# Patient Record
Sex: FEMALE | Race: WHITE | Employment: OTHER
[De-identification: names, ages, dates, MRNs, and addresses within clinical notes are randomized per-mention and may not be internally consistent; named-entity substitution may affect disease eponyms.]

---

## 2024-01-05 ENCOUNTER — TRANSCRIBE ORDERS (OUTPATIENT)
Dept: OTHER | Age: 89
End: 2024-01-05

## 2024-01-05 DIAGNOSIS — M17.0 PRIMARY OSTEOARTHRITIS OF BOTH KNEES: Primary | ICD-10-CM

## 2024-01-29 ENCOUNTER — TELEPHONE (OUTPATIENT)
Dept: PHYSICAL MEDICINE AND REHAB | Facility: CLINIC | Age: 89
End: 2024-01-29
Payer: MEDICARE

## 2024-01-29 NOTE — TELEPHONE ENCOUNTER
80% of patients who do well with test blocks have 50% or more relief with this procedure.  That does lead 20% who do not.  There is also potential that the patient will not receive relief with test blocks.  I have had similar patients have relief in some that have not.  It is hard for me to tell where your mother would be.

## 2024-01-29 NOTE — TELEPHONE ENCOUNTER
Patient is scheduled for a new patient consultation appointment to discuss Coolief for knees. Daughter, Xu, calling to inquire is this something that will be successful for someone who is 89 years old and had no cartilage in her knees. If it would not be, then they would cancel the appointment tomorrow. She does not want to put her mother through the pain of coming if not for her. Please advise.

## 2024-01-29 NOTE — TELEPHONE ENCOUNTER
"Phone call back to patient's daughter to relay this information to her. She plans to bring her mother in to \"give it a try.\" Very appreciative of the response from [facility] as well as call the prompt call back.    "

## 2024-01-30 ENCOUNTER — OFFICE VISIT (OUTPATIENT)
Dept: PHYSICAL MEDICINE AND REHAB | Facility: CLINIC | Age: 89
End: 2024-01-30
Payer: MEDICARE

## 2024-01-30 VITALS — SYSTOLIC BLOOD PRESSURE: 89 MMHG | HEART RATE: 78 BPM | DIASTOLIC BLOOD PRESSURE: 49 MMHG

## 2024-01-30 DIAGNOSIS — G89.29 CHRONIC PAIN OF LEFT KNEE: Primary | ICD-10-CM

## 2024-01-30 DIAGNOSIS — M25.562 CHRONIC PAIN OF LEFT KNEE: Primary | ICD-10-CM

## 2024-01-30 DIAGNOSIS — M25.561 CHRONIC PAIN OF RIGHT KNEE: ICD-10-CM

## 2024-01-30 DIAGNOSIS — M17.0 PRIMARY OSTEOARTHRITIS OF BOTH KNEES: ICD-10-CM

## 2024-01-30 DIAGNOSIS — G89.29 CHRONIC PAIN OF RIGHT KNEE: ICD-10-CM

## 2024-01-30 PROBLEM — H53.9 VISION DISTURBANCE FOLLOWING CVA (CEREBROVASCULAR ACCIDENT): Status: ACTIVE | Noted: 2019-07-15

## 2024-01-30 PROBLEM — D12.6 BENIGN NEOPLASM OF COLON: Status: ACTIVE | Noted: 2019-06-22

## 2024-01-30 PROBLEM — I48.92 PAROXYSMAL ATRIAL FLUTTER (H): Status: ACTIVE | Noted: 2019-06-23

## 2024-01-30 PROBLEM — H11.149 CONJUNCTIVAL XEROSIS: Status: ACTIVE | Noted: 2019-06-22

## 2024-01-30 PROBLEM — I69.398 VISION DISTURBANCE FOLLOWING CVA (CEREBROVASCULAR ACCIDENT): Status: ACTIVE | Noted: 2019-07-15

## 2024-01-30 PROBLEM — E78.5 HYPERLIPIDEMIA: Status: ACTIVE | Noted: 2019-06-22

## 2024-01-30 PROBLEM — G43.909 MIGRAINE HEADACHE: Status: ACTIVE | Noted: 2019-06-22

## 2024-01-30 PROBLEM — E03.9 HYPOTHYROIDISM: Status: ACTIVE | Noted: 2019-06-22

## 2024-01-30 PROBLEM — H53.462 LEFT HOMONYMOUS HEMIANOPSIA: Status: ACTIVE | Noted: 2019-08-20

## 2024-01-30 PROBLEM — H35.3230 BILATERAL EXUDATIVE AGE-RELATED MACULAR DEGENERATION (H): Status: ACTIVE | Noted: 2019-08-20

## 2024-01-30 PROBLEM — Z86.73 HISTORY OF CVA IN ADULTHOOD: Status: ACTIVE | Noted: 2019-07-15

## 2024-01-30 PROBLEM — I63.9 CEREBROVASCULAR ACCIDENT (CVA) DUE TO EMBOLISM (H): Status: ACTIVE | Noted: 2019-06-23

## 2024-01-30 PROBLEM — G45.9 TRANSIENT ISCHEMIC ATTACK (TIA): Status: ACTIVE | Noted: 2019-08-25

## 2024-01-30 PROCEDURE — 99204 OFFICE O/P NEW MOD 45 MIN: CPT | Performed by: PAIN MEDICINE

## 2024-01-30 RX ORDER — LEVOTHYROXINE SODIUM 125 UG/1
125 TABLET ORAL
COMMUNITY
Start: 2023-06-13

## 2024-01-30 RX ORDER — ROSUVASTATIN CALCIUM 10 MG/1
10 TABLET, COATED ORAL DAILY
COMMUNITY
Start: 2023-06-13

## 2024-01-30 ASSESSMENT — PAIN SCALES - GENERAL: PAINLEVEL: MODERATE PAIN (5)

## 2024-01-30 NOTE — PATIENT INSTRUCTIONS
[facility] Injection Requirements    A  is required for all fluoroscopically-guided injections.  Injection appointments may be cancelled if there are signs/symptoms of an active infection or if the patient is being actively treated with antibiotics for a diagnosed infection.  Patients may have their steroid injection cancelled if they have had another steroid injection within 2 weeks.  Diabetic patients will have their blood glucose levels checked the day of their injection and the appointment will be rescheduled if the blood glucose level is 300 or higher.  Patients with allergies to cortisone, local anesthetics, iodine, or contrast dye should contact the Spine Center to further discuss these considerations.  Patients scheduled for medial branch block diagnostic injections should refrain from taking pain medication the day of the procedure.  The medial branch block injection appointment will be rescheduled if the patient's pain rating is not 5/10 or greater at the time of the procedure.  Patients taking warfarin/Coumadin will have their INR checked the day of the procedure and the procedure may be rescheduled if the INR is greater than 3.0.  Please contact the Spine Center [phone]) if you are taking any prescription blood-thinning medications (aspirin, warfarin, Plavix, Lovenox, Eliquis, Brilinta, Effient, etc.) as special dosing adjustments may need to be made depending on the type of injection you are scheduled to receive.  It is recommended that you delay having your steroid injection if you have received any vaccines within 2 weeks.    ~Please call Nurse Navigation line [phone] with any questions or concerns about your treatment plan, if symptoms worsen and you would like to be seen urgently, or if you have problems controlling bladder and bowel function.

## 2024-01-30 NOTE — LETTER
1/30/2024         RE: Alma Berg  [address]  [facility] [address]        Dear Colleague,    Thank you for referring your patient, Alma Berg, to the [facility]. Please see a copy of my visit note below.    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  She is not a good candidate for surgery secondary to her age and she was referred here for potential genicular nerve radiofrequency ablation.    Patient is neurologically intact on exam. No myelopathic or red flag symptoms.     Diagnoses and all orders for this visit:  Chronic pain of left knee  -     PAIN Knee Genicular Nerve Block Left; Future  -     PAIN US Guided Peripheral NB; Future  Primary osteoarthritis of both knees  -     Orthopedic  Referral  -     PAIN Knee Genicular Nerve Block Left; Future  -     PAIN US Guided Peripheral NB; Future  Chronic pain of right knee    PLAN:  Reviewed spine anatomy and disease process. Discussed diagnosis and treatment options with the patient today. A shared decision making model was used.  The patient's values and choices were respected. The following represents what was discussed and decided upon by the provider and the patient.      -DIAGNOSTIC TESTS:    -- X-ray report of the knees is reviewed.    -PHYSICAL THERAPY: I recommend that she  continue with home exercises on a consistent basis.  Discussed the importance of core strengthening, ROM, stretching exercises with the patient and how each of these entities is important in decreasing pain.  Explained to the patient that the purpose of physical therapy is to teach the patient a home exercise program.  These exercises need to be performed every day in order to decrease pain and prevent future occurrences of pain.        -MEDICATIONS: No changes to medications.  -  Discussed multiple medication options today with patient. Discussed risks, side effects, and proper use of medications. Patient verbalized understanding.    -INTERVENTIONS: I ordered left knee genicular nerve blocks with an either radiofrequency ablation.  After we have done the left knee we would then focus our attention on the right knee with same procedures.  Discussed risks and benefits of injections with patient today.    -PATIENT EDUCATION: We discussed pain management in a multiple to fashion including physical therapy, medication management, possible future injections.    -FOLLOW-UP:   Patient will follow-up after injections.    Advised patient to call the Spine Center if symptoms worsen or you have problems controlling bladder and bowel function.   ______________________________________________________________________    SUBJECTIVE:  HPI:  Alma Berg  Is a 89 year old female who presents today for new patient evaluation of chronic left greater than right knee pain.  Patient notes that she has had pain for several years and has had steroid injections on an almost 3-month basis until they were not working about 3 years ago.  She had a discussion with an orthopedic surgeon who did not think she was a good candidate for surgery secondary to her age.  She was referred for possible genicular nerve radiofrequency ablation.  Patient reports that her pain is worse when she is getting up and down or walking.  She has been confined to  a wheelchair for most of her time while awake for several years secondary to her knee pain.  She only gets off the wheelchair when she is toileting or going to bed.  Her pain today is 6/10 at its worst is 9/10 its best a 0/10 when she is sitting.  She is here with her daughter who is very helpful in the planning process.  She denies any bowel or bladder changes, fevers, chills, unintentional weight loss.    -Treatment to Date: Several bilateral knee injections with cortisone.  Physical therapy.    -Medications:    Current Outpatient Medications   Medication     levothyroxine (SYNTHROID/LEVOTHROID) 125 MCG tablet     rivaroxaban ANTICOAGULANT (XARELTO ANTICOAGULANT) 15 MG TABS tablet     rosuvastatin (CRESTOR) 10 MG tablet     No current facility-administered medications for this visit.       Allergies   Allergen Reactions     Sulfa Antibiotics Rash       No past medical history on file.     Patient Active Problem List   Diagnosis     Benign neoplasm of colon     Bilateral exudative age-related macular degeneration (H)     Cerebrovascular accident (CVA) due to embolism (H)     Conjunctival xerosis     Constipation     Degenerative joint disease of knee     Degenerative joint disease of left hip     Difficulty sleeping     History of CVA in adulthood     Hyperlipidemia     Hypothyroidism     Left homonymous hemianopsia     Migraine headache     Osteopenia     Paroxysmal atrial flutter (H)     Postoperative anemia     Prediabetes     Transient ischemic attack (TIA)     Urethral caruncle     Vision disturbance following CVA (cerebrovascular accident)       Past surgical history: Right hip replacement.    Family history: Her grandmother had a stroke.    Reviewed past medical, surgical, and family history with patient found on new patient intake packet located in EMR Media tab.     SOCIAL HX: She denies smoking or using recreational drugs.  She drinks alcohol 3 times per year.    ROS:  Specifically negative for  bowel/bladder dysfunction, balance changes, headache, dizziness, foot drop, fevers, chills, appetite changes, nausea/vomiting, unexplained weight loss. Otherwise 13 systems reviewed are negative. Please see the patient's intake questionnaire from today for details.    OBJECTIVE:  BP (!) 89/49   Pulse 78     PHYSICAL EXAMINATION:    --CONSTITUTIONAL:  Vital signs as above.  No acute distress.  The patient is well nourished and well groomed.  --PSYCHIATRIC:  Appropriate mood and affect. The patient is awake, alert, oriented to person, place, time and answering questions appropriately with clear speech.    --SKIN:  Skin over the face and lower extremities is clean, dry, intact without rashes.    --RESPIRATORY: Normal rhythm and effort. No abnormal accessory muscle breathing patterns noted.   --STANDING EXAMINATION:  Normal lumbar lordosis noted, no lateral shift.  --SACROILIAC JOINT:  One Finger point test negative.  --GROSS MOTOR: Gait is antalgic.  Rises from a seated position with difficulty.  --LOWER EXTREMITY MOTOR TESTING:  Plantar flexion left 5/5, right 5/5   Dorsiflexion left 5/5, right 5/5   Great toe MTP extension left 5/5, right 5/5  Knee flexion left 5/5, right 5/5  Knee extension left 5/5, right 5/5   Hip flexion left 5/5, right 5/5  Hip abduction left 5/5, right 5/5  Hip adduction left 5/5, right 5/5   --Knees: Tenderness to palpation in the anterior/lateral/medial portions of her knees.  --NEUROLOGICAL:    Sensation to light touch is intact in the bilateral L4, L5, and S1 dermatomes.         Again, thank you for allowing me to participate in the care of your patient.        Sincerely,        Russell Lim, DO

## 2024-01-30 NOTE — PROGRESS NOTES
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  She is not a good candidate for surgery secondary to her age and she was referred here for potential genicular nerve radiofrequency ablation.    Patient is neurologically intact on exam. No myelopathic or red flag symptoms.     Diagnoses and all orders for this visit:  Chronic pain of left knee  -     PAIN Knee Genicular Nerve Block Left; Future  -     PAIN US Guided Peripheral NB; Future  Primary osteoarthritis of both knees  -     Orthopedic  Referral  -     PAIN Knee Genicular Nerve Block Left; Future  -     PAIN US Guided Peripheral NB; Future  Chronic pain of right knee    PLAN:  Reviewed spine anatomy and disease process. Discussed diagnosis and treatment options with the patient today. A shared decision making model was used.  The patient's values and choices were respected. The following represents what was discussed and decided upon by the provider and the patient.      -DIAGNOSTIC TESTS:    -- X-ray report of the knees is reviewed.    -PHYSICAL THERAPY: I recommend that she continue with home exercises on a consistent basis.  Discussed the importance of core strengthening, ROM, stretching exercises with the patient and how each of these entities is important in decreasing pain.  Explained to the patient that the purpose of physical therapy  is to teach the patient a home exercise program.  These exercises need to be performed every day in order to decrease pain and prevent future occurrences of pain.        -MEDICATIONS: No changes to medications.  -  Discussed multiple medication options today with patient. Discussed risks, side effects, and proper use of medications. Patient verbalized understanding.    -INTERVENTIONS: I ordered left knee genicular nerve blocks with an either radiofrequency ablation.  After we have done the left knee we would then focus our attention on the right knee with same procedures.  Discussed risks and benefits of injections with patient today.    -PATIENT EDUCATION: We discussed pain management in a multiple to fashion including physical therapy, medication management, possible future injections.    -FOLLOW-UP:   Patient will follow-up after injections.    Advised patient to call the Spine Center if symptoms worsen or you have problems controlling bladder and bowel function.   ______________________________________________________________________    SUBJECTIVE:  HPI:  Alma Berg  Is a 89 year old female who presents today for new patient evaluation of chronic left greater than right knee pain.  Patient notes that she has had pain for several years and has had steroid injections on an almost 3-month basis until they were not working about 3 years ago.  She had a discussion with an orthopedic surgeon who did not think she was a good candidate for surgery secondary to her age.  She was referred for possible genicular nerve radiofrequency ablation.  Patient reports that her pain is worse when she is getting up and down or walking.  She has been confined to a wheelchair for most of her time while awake for several years secondary to her knee pain.  She only gets off the wheelchair when she is toileting or going to bed.  Her pain today is 6/10 at its worst is 9/10 its best a 0/10 when she is sitting.  She is here with her  daughter who is very helpful in the planning process.  She denies any bowel or bladder changes, fevers, chills, unintentional weight loss.    -Treatment to Date: Several bilateral knee injections with cortisone.  Physical therapy.    -Medications:    Current Outpatient Medications   Medication    levothyroxine (SYNTHROID/LEVOTHROID) 125 MCG tablet    rivaroxaban ANTICOAGULANT (XARELTO ANTICOAGULANT) 15 MG TABS tablet    rosuvastatin (CRESTOR) 10 MG tablet     No current facility-administered medications for this visit.       Allergies   Allergen Reactions    Sulfa Antibiotics Rash       No past medical history on file.     Patient Active Problem List   Diagnosis    Benign neoplasm of colon    Bilateral exudative age-related macular degeneration (H)    Cerebrovascular accident (CVA) due to embolism (H)    Conjunctival xerosis    Constipation    Degenerative joint disease of knee    Degenerative joint disease of left hip    Difficulty sleeping    History of CVA in adulthood    Hyperlipidemia    Hypothyroidism    Left homonymous hemianopsia    Migraine headache    Osteopenia    Paroxysmal atrial flutter (H)    Postoperative anemia    Prediabetes    Transient ischemic attack (TIA)    Urethral caruncle    Vision disturbance following CVA (cerebrovascular accident)       Past surgical history: Right hip replacement.    Family history: Her grandmother had a stroke.    Reviewed past medical, surgical, and family history with patient found on new patient intake packet located in EMR Media tab.     SOCIAL HX: She denies smoking or using recreational drugs.  She drinks alcohol 3 times per year.    ROS:  Specifically negative for bowel/bladder dysfunction, balance changes, headache, dizziness, foot drop, fevers, chills, appetite changes, nausea/vomiting, unexplained weight loss. Otherwise 13 systems reviewed are negative. Please see the patient's intake questionnaire from today for details.    OBJECTIVE:  BP (!) 89/49   Pulse  78     PHYSICAL EXAMINATION:    --CONSTITUTIONAL:  Vital signs as above.  No acute distress.  The patient is well nourished and well groomed.  --PSYCHIATRIC:  Appropriate mood and affect. The patient is awake, alert, oriented to person, place, time and answering questions appropriately with clear speech.    --SKIN:  Skin over the face and lower extremities is clean, dry, intact without rashes.    --RESPIRATORY: Normal rhythm and effort. No abnormal accessory muscle breathing patterns noted.   --STANDING EXAMINATION:  Normal lumbar lordosis noted, no lateral shift.  --SACROILIAC JOINT:  One Finger point test negative.  --GROSS MOTOR: Gait is antalgic.  Rises from a seated position with difficulty.  --LOWER EXTREMITY MOTOR TESTING:  Plantar flexion left 5/5, right 5/5   Dorsiflexion left 5/5, right 5/5   Great toe MTP extension left 5/5, right 5/5  Knee flexion left 5/5, right 5/5  Knee extension left 5/5, right 5/5   Hip flexion left 5/5, right 5/5  Hip abduction left 5/5, right 5/5  Hip adduction left 5/5, right 5/5   --Knees: Tenderness to palpation in the anterior/lateral/medial portions of her knees.  --NEUROLOGICAL:    Sensation to light touch is intact in the bilateral L4, L5, and S1 dermatomes.

## 2024-02-14 ENCOUNTER — RADIOLOGY INJECTION OFFICE VISIT (OUTPATIENT)
Dept: PHYSICAL MEDICINE AND REHAB | Facility: CLINIC | Age: 89
End: 2024-02-14
Attending: PAIN MEDICINE
Payer: MEDICARE

## 2024-02-14 VITALS
TEMPERATURE: 97.6 F | HEART RATE: 106 BPM | SYSTOLIC BLOOD PRESSURE: 132 MMHG | RESPIRATION RATE: 16 BRPM | OXYGEN SATURATION: 98 % | DIASTOLIC BLOOD PRESSURE: 76 MMHG

## 2024-02-14 DIAGNOSIS — M17.0 PRIMARY OSTEOARTHRITIS OF BOTH KNEES: ICD-10-CM

## 2024-02-14 DIAGNOSIS — G89.29 CHRONIC PAIN OF LEFT KNEE: ICD-10-CM

## 2024-02-14 DIAGNOSIS — M25.562 CHRONIC PAIN OF LEFT KNEE: ICD-10-CM

## 2024-02-14 PROCEDURE — 64454 NJX AA&/STRD GNCLR NRV BRNCH: CPT | Mod: LT | Performed by: PAIN MEDICINE

## 2024-02-14 RX ORDER — BUPIVACAINE HYDROCHLORIDE 5 MG/ML
INJECTION, SOLUTION EPIDURAL; INTRACAUDAL
Status: COMPLETED | OUTPATIENT
Start: 2024-02-14 | End: 2024-02-14

## 2024-02-14 RX ORDER — LIDOCAINE HYDROCHLORIDE 10 MG/ML
INJECTION, SOLUTION EPIDURAL; INFILTRATION; INTRACAUDAL; PERINEURAL
Status: COMPLETED | OUTPATIENT
Start: 2024-02-14 | End: 2024-02-14

## 2024-02-14 RX ADMIN — BUPIVACAINE HYDROCHLORIDE 2 ML: 5 INJECTION, SOLUTION EPIDURAL; INTRACAUDAL at 14:47

## 2024-02-14 RX ADMIN — LIDOCAINE HYDROCHLORIDE 4 ML: 10 INJECTION, SOLUTION EPIDURAL; INFILTRATION; INTRACAUDAL; PERINEURAL at 14:46

## 2024-02-14 ASSESSMENT — PAIN SCALES - GENERAL
PAINLEVEL: MODERATE PAIN (4)
PAINLEVEL: NO PAIN (0)
PAINLEVEL: SEVERE PAIN (7)

## 2024-03-06 ENCOUNTER — TELEPHONE (OUTPATIENT)
Dept: PHYSICAL MEDICINE AND REHAB | Facility: CLINIC | Age: 89
End: 2024-03-06

## 2024-03-06 DIAGNOSIS — M25.562 LEFT KNEE PAIN: Primary | ICD-10-CM

## 2024-03-06 NOTE — TELEPHONE ENCOUNTER
Spoke to pt regarding Dr. Lim recommendation to move forward with left genicular nerve ablation. Pt was agreeable. Order placed and message forwarded to Pain Center team.

## 2024-04-15 ENCOUNTER — RADIOLOGY INJECTION OFFICE VISIT (OUTPATIENT)
Dept: PALLIATIVE MEDICINE | Facility: OTHER | Age: 89
End: 2024-04-15
Attending: PAIN MEDICINE
Payer: MEDICARE

## 2024-04-15 VITALS — OXYGEN SATURATION: 100 % | HEART RATE: 79 BPM | SYSTOLIC BLOOD PRESSURE: 140 MMHG | DIASTOLIC BLOOD PRESSURE: 79 MMHG

## 2024-04-15 DIAGNOSIS — M25.562 LEFT KNEE PAIN: Primary | ICD-10-CM

## 2024-04-15 PROCEDURE — 250N000011 HC RX IP 250 OP 636: Performed by: PAIN MEDICINE

## 2024-04-15 PROCEDURE — 64624 DSTRJ NULYT AGT GNCLR NRV: CPT | Mod: LT | Performed by: PAIN MEDICINE

## 2024-04-15 PROCEDURE — 20611 DRAIN/INJ JOINT/BURSA W/US: CPT | Performed by: PAIN MEDICINE

## 2024-04-15 PROCEDURE — 250N000009 HC RX 250: Performed by: PAIN MEDICINE

## 2024-04-15 RX ORDER — LIDOCAINE HYDROCHLORIDE 10 MG/ML
4 INJECTION, SOLUTION EPIDURAL; INFILTRATION; INTRACAUDAL; PERINEURAL ONCE
Status: COMPLETED | OUTPATIENT
Start: 2024-04-15 | End: 2024-04-15

## 2024-04-15 RX ORDER — BUPIVACAINE HYDROCHLORIDE 2.5 MG/ML
4 INJECTION, SOLUTION INFILTRATION; PERINEURAL ONCE
Status: COMPLETED | OUTPATIENT
Start: 2024-04-15 | End: 2024-04-15

## 2024-04-15 RX ADMIN — LIDOCAINE HYDROCHLORIDE 4 ML: 10 INJECTION, SOLUTION EPIDURAL; INFILTRATION; INTRACAUDAL; PERINEURAL at 13:24

## 2024-04-15 RX ADMIN — LIDOCAINE HYDROCHLORIDE 6 ML: 20 INJECTION, SOLUTION EPIDURAL; INFILTRATION; INTRACAUDAL; PERINEURAL at 13:25

## 2024-04-15 RX ADMIN — BUPIVACAINE HYDROCHLORIDE 10 MG: 2.5 INJECTION, SOLUTION EPIDURAL; INFILTRATION; INTRACAUDAL; PERINEURAL at 13:22

## 2024-04-15 ASSESSMENT — PAIN SCALES - GENERAL
PAINLEVEL: MODERATE PAIN (5)
PAINLEVEL: SEVERE PAIN (6)

## 2024-04-15 NOTE — NURSING NOTE
Discharge Information    Discharge Criteria = When patient returns to baseline or as per MD order    Consciousness:  Pt is fully awake    Circulation:  BP +/- 20% of pre-procedure level    Respiration:  Patient is able to breathe deeply    O2 Sat:  Patient is able to maintain O2 Sat >92% on room air    Activity:  Moves 4 extremities on command    Ambulation:  Patient is able to stand and walk or stand and pivot into wheelchair    Notes:   Discharge instructions and AVS given to patient    Patient meets criteria for discharge?  YES    Admitted to PCU?  No    Responsible adult present to accompany patient home?  Yes    Signature/Title:    MICHAEL MARSHALL, RN  RN Care Coordinator  [facility]

## 2024-04-15 NOTE — PATIENT INSTRUCTIONS
[facility]    POST COOLIEF RADIOFREQUENCY: left knee    During office hours (8:00 am - 4:00 pm) questions or concerns may be answered by calling  Spine Center Navigation Nurses at [phone]. Messages received after hours will be  returned the following business day.    All patients:  You may experience an increase in your symptoms for the first 5 - 7 days. Soreness may persist  the first 3 weeks as it takes 4-6 weeks for the nerves to fully heal.    You may use ice on the injections site, as frequently as 20 minutes each hour if needed.  You may take your pain medicine  You may continue taking your regular medication  You may shower. No swimming, hot tub or tub bath for 48 hours.   You may remove your bandage/ bandaid as soon as you are home  You may resume light activities as tolerated  Resume your usual diet as tolerated  It is strongly advised you do not drive for 1-3 hours post injection  If you had any oral sedation do not drive for 8 hours post injection        Possible side effects: call the Spine Center if you are concerned  Bruising bleeding at injection sites  Increased numbness   Redness or swelling  Weakness       FOLLOW UP VISIT WITH DR. HYLTON AT THE SPINE CENTER IN 6 WEEKS.    Please call Spine Center Scheduling -- [phone] for the appointment if you don't hear from the Spine Center in the next couple days

## 2024-04-15 NOTE — NURSING NOTE
Pre-procedure Intake  If YES to any questions or NO to having a   Please complete laminated checklist and leave on the computer keyboard for Provider, verbally inform provider if able.    For SCS Trial, RFA's or any sedation procedure:  Have you been fasting? no  If yes, for how long? Not needed    Are you taking any any blood thinners such as Coumadin, Warfarin, Jantoven, Pradaxa Xarelto, Eliquis, Edoxaban, Enoxaparin, Lovenox, Heparin, Arixtra, Fondaparinux, or Fragmin? OR Antiplatelet medication such as Plavix, Brilinta, or Effient?   NO yes Xarelto  If yes, when did you take your last dose? 7 days ago-not going to continue    Do you take aspirin?  NO  If cervical procedure, have you held aspirin for 6 days?   N/A     Do you have any allergies to contrast dye, iodine, steroid and/or numbing medications?  NO     Are you currently taking antibiotics or have an active infection?  NO     Have you had a fever/elevated temperature within the past week? NO     Are you currently taking oral steroids? NO     Do you have a ?  Yes     Are you pregnant or breastfeeding? N/A     Have you received any vaccines in the past week? NO     Are you planning to get any vaccines in the next week or two? NO    Notify provider and RNs if systolic BP >170, diastolic BP >100, P >100 or O2 sats < 90%

## 2024-04-15 NOTE — NURSING NOTE
Coolief genicular RFA to left knee  Avanos:  OPZS-15-00-4  Lot [id number]  Expires 07-  Additional introducer:  FDI-17-50-4  Lot:[id number]  Expires:02/16/2025    Medications:  Lidocaine 1%  Lidocaine 2%  Bupivacaine 0.25%